# Patient Record
Sex: FEMALE | Race: OTHER | NOT HISPANIC OR LATINO
[De-identification: names, ages, dates, MRNs, and addresses within clinical notes are randomized per-mention and may not be internally consistent; named-entity substitution may affect disease eponyms.]

---
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
## 2022-08-17 ENCOUNTER — EMERGENCY (EMERGENCY)
Facility: HOSPITAL | Age: 22
LOS: 1 days | Discharge: HOME | End: 2022-08-17
Attending: EMERGENCY MEDICINE | Admitting: EMERGENCY MEDICINE

## 2022-08-17 VITALS
HEART RATE: 75 BPM | DIASTOLIC BLOOD PRESSURE: 63 MMHG | TEMPERATURE: 98 F | OXYGEN SATURATION: 100 % | RESPIRATION RATE: 17 BRPM | SYSTOLIC BLOOD PRESSURE: 119 MMHG

## 2022-08-17 PROCEDURE — 99283 EMERGENCY DEPT VISIT LOW MDM: CPT

## 2022-08-17 PROCEDURE — 73620 X-RAY EXAM OF FOOT: CPT | Mod: 26,RT

## 2022-08-17 RX ORDER — CEPHALEXIN 500 MG
1 CAPSULE ORAL
Qty: 40 | Refills: 0
Start: 2022-08-17 | End: 2022-08-26

## 2022-08-17 RX ORDER — CEPHALEXIN 500 MG
500 CAPSULE ORAL ONCE
Refills: 0 | Status: COMPLETED | OUTPATIENT
Start: 2022-08-17 | End: 2022-08-17

## 2022-08-17 RX ADMIN — Medication 500 MILLIGRAM(S): at 13:28

## 2022-08-17 NOTE — ED PROVIDER NOTE - PHYSICAL EXAMINATION
ATTENDING PHYSICAL EXAM  GEN - NAD; well appearing; A+O x3, noted ambulation with slight limp due to right 1st toe pain.  HEAD - NC/AT; EYES/NOSE - PERRL, EOMI, mucous membranes moist, no discharge; THROAT: Oral cavity and pharynx normal. No inflammation, swelling, exudate, or lesions  NECK: Neck supple, non-tender without lymphadenopathy, no masses, no JVD  PULMONARY - CTA b/l, symmetric breath sounds, no w/r/r  CARDIAC -s1s2, RRR, no M,R,G  ABDOMEN - +NABS, ND, NT, soft, no guarding, no rebound, no masses   BACK - no CVA tenderness, No vertebral or paravertebral tenderness  EXTREMITIES - Right hip and knee normal.  No calf tenderness.  No edema.  Right ankle normal.  Right foot without bony tenderness or swelling besides right 1st toe.  Toe without obvious deformity, no laceration.  Nail with white nail polish so unable to see if subungual hematoma.  Nail with slight mobility. + large blister from proximal aspect of nail to mid-prox phalanx on dorsal aspect.  + erythema c/w ceullulitis on dorsal aspect of distal and prox phalanx.  MCP and IP joints with FROM without pain.  Normal cap refill.

## 2022-08-17 NOTE — ED PROVIDER NOTE - PROGRESS NOTE DETAILS
Patient re-assessed.  Feels well.  Erythema has slightly decreased from line drawn marking edge of cellulitis.  Xray result reviewed.  I also reviewed xray and noted circular deformity in middle of distal phalanx.  Discussed with radiologist who feels likely an old injury as well demarcated.  Patient now reports did have a toe injury a few years ago.  She is stable for discharge.  Discussed need for antibiotic and return precautions. Patient reported that pharmacy called her to say that they do not have medication in stock.  Prescription transmitted to [facility] on [facility].

## 2022-08-17 NOTE — ED ADULT NURSE NOTE - OBJECTIVE STATEMENT
Received patient in room 15 c/o right great toe pain associated w/redness, swelling today, hx of injury 4 days ago. Patient denies fever, chills. Patient is A&OX4, airway patent, breathing unlabored and even. Awaiting MD evaluation. Side rails up and safety maintained. Fall precaution in place.

## 2022-08-17 NOTE — ED PROVIDER NOTE - CLINICAL SUMMARY MEDICAL DECISION MAKING FREE TEXT BOX
20 y/o F s/p right 1st toe trauma 4 days ago, now with cellulitis.  No evidence fx or open fx.  Will check xray.  Patient without any high risk conditions requiring IV abx.  Will treat with cephalexin.

## 2022-08-17 NOTE — ED PROVIDER NOTE - PATIENT PORTAL LINK FT
You can access the FollowMyHealth Patient Portal offered by [facility] by registering at the following website: [email or web address]. By joining [employer]’s FollowMyHealth portal, you will also be able to view your health information using other applications (apps) compatible with our system.

## 2022-08-17 NOTE — ED ADULT NURSE NOTE - NSIMPLEMENTINTERV_GEN_ALL_ED
Implemented All Fall with Harm Risk Interventions:  [place] to call system. Call bell, personal items and telephone within reach. Instruct patient to call for assistance. Room bathroom lighting operational. Non-slip footwear when patient is off stretcher. Physically safe environment: no spills, clutter or unnecessary equipment. Stretcher in lowest position, wheels locked, appropriate side rails in place. Provide visual cue, wrist band, yellow gown, etc. Monitor gait and stability. Monitor for mental status changes and reorient to person, place, and time. Review medications for side effects contributing to fall risk. Reinforce activity limits and safety measures with patient and family. Provide visual clues: red socks.

## 2022-08-17 NOTE — ED PROVIDER NOTE - NSFOLLOWUPINSTRUCTIONS_ED_ALL_ED_FT
Take cephalexin antibiotic as prescribed.  Do not break blister.  It will rupture on its own.  Can soak foot in warm epsom salt water twice a day.  Take tylenol or ibuprofen for pain.  Follow up with your doctor within one week for re-evaluation.  Return to ER immediately for increased pain, increased swelling or redness, or any further concern.

## 2022-08-17 NOTE — ED ADULT TRIAGE NOTE - CHIEF COMPLAINT QUOTE
Pt c/o pain and swelling to right large to after injury 4 days ago. Pt was seen at urgent care and told to take Motrin for pain. Pt endorsing persistent pain.

## 2022-08-17 NOTE — ED PROVIDER NOTE - OBJECTIVE STATEMENT
20 y/o F with cousin at bedside.  Reports she 'stubbed' right 1st toe on Sunday.  Has continued to have mild pain in toe.  To ED as having increased redness and swelling since yesterday.  To PMD yesterday who did xray and prescribed lidocaine spray and ibuprofen.  No fever.  No other foot or ankle pain.  No other injury.  Denies recent cough or covid.

## 2022-08-22 ENCOUNTER — EMERGENCY (EMERGENCY)
Facility: HOSPITAL | Age: 22
LOS: 1 days | Discharge: HOME | End: 2022-08-22
Attending: EMERGENCY MEDICINE
Payer: MEDICAID

## 2022-08-22 VITALS
OXYGEN SATURATION: 99 % | RESPIRATION RATE: 16 BRPM | TEMPERATURE: 99 F | DIASTOLIC BLOOD PRESSURE: 75 MMHG | SYSTOLIC BLOOD PRESSURE: 115 MMHG | HEART RATE: 68 BPM

## 2022-08-22 VITALS
WEIGHT: 156.97 LBS | TEMPERATURE: 98 F | OXYGEN SATURATION: 97 % | SYSTOLIC BLOOD PRESSURE: 115 MMHG | HEIGHT: 67 IN | RESPIRATION RATE: 16 BRPM | DIASTOLIC BLOOD PRESSURE: 80 MMHG | HEART RATE: 68 BPM

## 2022-08-22 LAB
ALBUMIN SERPL ELPH-MCNC: 4.4 G/DL — SIGNIFICANT CHANGE UP (ref 3.3–5)
ALP SERPL-CCNC: 65 U/L — SIGNIFICANT CHANGE UP (ref 40–120)
ALT FLD-CCNC: 21 U/L — SIGNIFICANT CHANGE UP (ref 10–45)
ANION GAP SERPL CALC-SCNC: 13 MMOL/L — SIGNIFICANT CHANGE UP (ref 5–17)
AST SERPL-CCNC: 33 U/L — SIGNIFICANT CHANGE UP (ref 10–40)
BASOPHILS # BLD AUTO: 0.02 K/UL — SIGNIFICANT CHANGE UP (ref 0–0.2)
BASOPHILS NFR BLD AUTO: 0.3 % — SIGNIFICANT CHANGE UP (ref 0–2)
BILIRUB SERPL-MCNC: 0.8 MG/DL — SIGNIFICANT CHANGE UP (ref 0.2–1.2)
BUN SERPL-MCNC: 6 MG/DL — LOW (ref 7–23)
CALCIUM SERPL-MCNC: 9.2 MG/DL — SIGNIFICANT CHANGE UP (ref 8.4–10.5)
CHLORIDE SERPL-SCNC: 105 MMOL/L — SIGNIFICANT CHANGE UP (ref 96–108)
CO2 SERPL-SCNC: 20 MMOL/L — LOW (ref 22–31)
CREAT SERPL-MCNC: 0.54 MG/DL — SIGNIFICANT CHANGE UP (ref 0.5–1.3)
EGFR: 135 ML/MIN/1.73M2 — SIGNIFICANT CHANGE UP
EOSINOPHIL # BLD AUTO: 0.09 K/UL — SIGNIFICANT CHANGE UP (ref 0–0.5)
EOSINOPHIL NFR BLD AUTO: 1.2 % — SIGNIFICANT CHANGE UP (ref 0–6)
FLUAV AG NPH QL: SIGNIFICANT CHANGE UP
FLUBV AG NPH QL: SIGNIFICANT CHANGE UP
GLUCOSE SERPL-MCNC: 102 MG/DL — HIGH (ref 70–99)
HCG SERPL-ACNC: <2 MIU/ML — SIGNIFICANT CHANGE UP
HCT VFR BLD CALC: 37.8 % — SIGNIFICANT CHANGE UP (ref 34.5–45)
HGB BLD-MCNC: 12.8 G/DL — SIGNIFICANT CHANGE UP (ref 11.5–15.5)
IMM GRANULOCYTES NFR BLD AUTO: 0.5 % — SIGNIFICANT CHANGE UP (ref 0–1.5)
LYMPHOCYTES # BLD AUTO: 1.45 K/UL — SIGNIFICANT CHANGE UP (ref 1–3.3)
LYMPHOCYTES # BLD AUTO: 18.5 % — SIGNIFICANT CHANGE UP (ref 13–44)
MAGNESIUM SERPL-MCNC: 1.9 MG/DL — SIGNIFICANT CHANGE UP (ref 1.6–2.6)
MCHC RBC-ENTMCNC: 30.1 PG — SIGNIFICANT CHANGE UP (ref 27–34)
MCHC RBC-ENTMCNC: 33.9 GM/DL — SIGNIFICANT CHANGE UP (ref 32–36)
MCV RBC AUTO: 88.9 FL — SIGNIFICANT CHANGE UP (ref 80–100)
MONOCYTES # BLD AUTO: 0.79 K/UL — SIGNIFICANT CHANGE UP (ref 0–0.9)
MONOCYTES NFR BLD AUTO: 10.1 % — SIGNIFICANT CHANGE UP (ref 2–14)
NEUTROPHILS # BLD AUTO: 5.43 K/UL — SIGNIFICANT CHANGE UP (ref 1.8–7.4)
NEUTROPHILS NFR BLD AUTO: 69.4 % — SIGNIFICANT CHANGE UP (ref 43–77)
NRBC # BLD: 0 /100 WBCS — SIGNIFICANT CHANGE UP (ref 0–0)
PHOSPHATE SERPL-MCNC: 3.5 MG/DL — SIGNIFICANT CHANGE UP (ref 2.5–4.5)
PLATELET # BLD AUTO: 252 K/UL — SIGNIFICANT CHANGE UP (ref 150–400)
POTASSIUM SERPL-MCNC: 5.2 MMOL/L — SIGNIFICANT CHANGE UP (ref 3.5–5.3)
POTASSIUM SERPL-SCNC: 5.2 MMOL/L — SIGNIFICANT CHANGE UP (ref 3.5–5.3)
PROT SERPL-MCNC: 8.1 G/DL — SIGNIFICANT CHANGE UP (ref 6–8.3)
RBC # BLD: 4.25 M/UL — SIGNIFICANT CHANGE UP (ref 3.8–5.2)
RBC # FLD: 12 % — SIGNIFICANT CHANGE UP (ref 10.3–14.5)
RSV RNA NPH QL NAA+NON-PROBE: SIGNIFICANT CHANGE UP
SARS-COV-2 RNA SPEC QL NAA+PROBE: SIGNIFICANT CHANGE UP
SODIUM SERPL-SCNC: 138 MMOL/L — SIGNIFICANT CHANGE UP (ref 135–145)
TROPONIN T, HIGH SENSITIVITY RESULT: <6 NG/L — SIGNIFICANT CHANGE UP (ref 0–51)
WBC # BLD: 7.82 K/UL — SIGNIFICANT CHANGE UP (ref 3.8–10.5)
WBC # FLD AUTO: 7.82 K/UL — SIGNIFICANT CHANGE UP (ref 3.8–10.5)

## 2022-08-22 PROCEDURE — 93308 TTE F-UP OR LMTD: CPT | Mod: 26

## 2022-08-22 PROCEDURE — 80053 COMPREHEN METABOLIC PANEL: CPT

## 2022-08-22 PROCEDURE — 96376 TX/PRO/DX INJ SAME DRUG ADON: CPT

## 2022-08-22 PROCEDURE — 96375 TX/PRO/DX INJ NEW DRUG ADDON: CPT

## 2022-08-22 PROCEDURE — 84100 ASSAY OF PHOSPHORUS: CPT

## 2022-08-22 PROCEDURE — 71045 X-RAY EXAM CHEST 1 VIEW: CPT

## 2022-08-22 PROCEDURE — 87637 SARSCOV2&INF A&B&RSV AMP PRB: CPT

## 2022-08-22 PROCEDURE — 99285 EMERGENCY DEPT VISIT HI MDM: CPT | Mod: 25

## 2022-08-22 PROCEDURE — 84484 ASSAY OF TROPONIN QUANT: CPT

## 2022-08-22 PROCEDURE — 85025 COMPLETE CBC W/AUTO DIFF WBC: CPT

## 2022-08-22 PROCEDURE — 93308 TTE F-UP OR LMTD: CPT

## 2022-08-22 PROCEDURE — 84702 CHORIONIC GONADOTROPIN TEST: CPT

## 2022-08-22 PROCEDURE — 93005 ELECTROCARDIOGRAM TRACING: CPT

## 2022-08-22 PROCEDURE — 93010 ELECTROCARDIOGRAM REPORT: CPT

## 2022-08-22 PROCEDURE — 71045 X-RAY EXAM CHEST 1 VIEW: CPT | Mod: 26

## 2022-08-22 PROCEDURE — 96374 THER/PROPH/DIAG INJ IV PUSH: CPT

## 2022-08-22 PROCEDURE — 83735 ASSAY OF MAGNESIUM: CPT

## 2022-08-22 RX ORDER — ONDANSETRON 8 MG/1
4 TABLET, FILM COATED ORAL ONCE
Refills: 0 | Status: COMPLETED | OUTPATIENT
Start: 2022-08-22 | End: 2022-08-22

## 2022-08-22 RX ORDER — SODIUM CHLORIDE 9 MG/ML
1000 INJECTION, SOLUTION INTRAVENOUS ONCE
Refills: 0 | Status: COMPLETED | OUTPATIENT
Start: 2022-08-22 | End: 2022-08-22

## 2022-08-22 RX ORDER — FAMOTIDINE 10 MG/ML
20 INJECTION INTRAVENOUS ONCE
Refills: 0 | Status: COMPLETED | OUTPATIENT
Start: 2022-08-22 | End: 2022-08-22

## 2022-08-22 RX ADMIN — ONDANSETRON 4 MILLIGRAM(S): 8 TABLET, FILM COATED ORAL at 08:07

## 2022-08-22 RX ADMIN — ONDANSETRON 4 MILLIGRAM(S): 8 TABLET, FILM COATED ORAL at 12:35

## 2022-08-22 RX ADMIN — Medication 1 MILLIGRAM(S): at 10:14

## 2022-08-22 RX ADMIN — SODIUM CHLORIDE 1000 MILLILITER(S): 9 INJECTION, SOLUTION INTRAVENOUS at 08:07

## 2022-08-22 RX ADMIN — FAMOTIDINE 20 MILLIGRAM(S): 10 INJECTION INTRAVENOUS at 08:47

## 2022-08-22 NOTE — ED PROVIDER NOTE - NSFOLLOWUPINSTRUCTIONS_ED_ALL_ED_FT
Nausea / Vomiting    Nausea is the feeling that you have to vomit. As nausea gets worse, it can lead to vomiting. Vomiting puts you at an increased risk for dehydration. Older adults and people with other diseases or a weak immune system are at higher risk for dehydration. Drink clear fluids in small but frequent amounts as tolerated. Eat bland, easy-to-digest foods in small amounts as tolerated.    Your symptoms may be the result of the antibiotic you are taking, we have prescribed you a new antibiotic, please take as prescribed. Please make sure to take your antibiotic with food.     You may take Tylenol, and/or Ibuprofen as needed for pain. Please follow dosing instructions on medication labeling.    We have referred you to a podiatrist, please call the number provided to schedule an appointment. Our referral team will reach out to you over the next couple of days to help you schedule an appointment if you have are unable to.     SEEK IMMEDIATE MEDICAL CARE IF YOU HAVE ANY OF THE FOLLOWING SYMPTOMS: fever, inability to keep sufficient fluids down, black or bloody vomitus, black or bloody stools, lightheadedness/dizziness, chest pain, severe headache, rash, shortness of breath, cold or clammy skin, confusion, pain with urination, or any signs of dehydration.

## 2022-08-22 NOTE — ED PROVIDER NOTE - CLINICAL SUMMARY MEDICAL DECISION MAKING FREE TEXT BOX
21F w/ no PMHx presents with 3 days of Nausea, decreased PO, weakness, CP, subjective SOB, and toe pain. Pt recent [facility] ED visit 8/17 for Left 1st toe infection, prescribed keflex, nausea w/ emesis post abx administration. CP substernal. No family cardiac hx. LMP 1 mo ago. Toe pain improved. AVSS. abd soft. NDNT, lungs CTA b/l, toe with some dry discharge, loose nail ttp, no fluctuance or erythema. Medication side effects vs pericardiditis/myocardidits. Will get labs, TTE + trop to r/o myocarditis, give meds and reassess. 21F w/ no PMHx presents with 3 days of Nausea, decreased PO, weakness, CP, subjective SOB, and toe pain. Pt recent [facility] ED visit 8/17 for Left 1st toe infection, prescribed keflex, nausea w/ emesis post abx administration. CP substernal. No family cardiac hx. LMP 1 mo ago. Toe pain improved. AVSS. abd soft. NDNT, lungs CTA b/l, toe with some dry discharge, loose nail ttp, no fluctuance or erythema. Medication side effects vs pericardiditis/myocardidits. Will get labs, TTE + trop to r/o myocarditis, give meds and reassess.  teodoro - 21 f  w recent paranychia tx w abx with nausae and weakness - toe improved no longer [id number] and fluctuant still draining - no crepitus - now co of days of intermitt cp lightheadedness and sopb mild non prod cough , covid vaccinated no fever- vitals unremarkable- low suspicion for acs , no risk factors no ischem changes on ekg -- will pocus eval and send single trop as screening for myocarditis

## 2022-08-22 NOTE — ED PROVIDER NOTE - PATIENT PORTAL LINK FT
You can access the FollowMyHealth Patient Portal offered by [facility] by registering at the following website: [email or web address]. By joining [employer]’s FollowMyHealth portal, you will also be able to view your health information using other applications (apps) compatible with our system.

## 2022-08-22 NOTE — ED ADULT NURSE NOTE - OBJECTIVE STATEMENT
Pt presented to ed with c/o generalized weakness, decreased PO intake, nausea/vomiting, feeling anxious, dizziness, chest heaviness, lightheadedness that started 1 wk ago. pt has been taking antibiotics - Cephalexin 500mg 4 times a day for her RT big toe infection. pt denies fever, chills, sob. pt also reports feeling depressed lately. pt denies suicidal or homicidal ideation at this time. Will continue to monitor. Pt presented to ed with c/o generalized weakness, decreased PO intake, nausea/vomiting, feeling anxious, dizziness, chest heaviness, lightheadedness that started 1 wk ago. pt has been taking antibiotics - Cephalexin 500mg 4 times a day for her RT big toe infection. Last dose - yesterday. pt denies fever, chills, sob. pt also reports feeling depressed lately. pt denies suicidal or homicidal ideation at this time. Will continue to monitor.

## 2022-08-22 NOTE — ED PROVIDER NOTE - PHYSICAL EXAMINATION
GENERAL: well appearing in no acute distress, non-toxic appearing  CARDIAC: regular rate and rhythm, normal S1S2, no appreciable murmurs, 2+ pulses in UE/LE b/l  PULM: normal breath sounds, clear to ascultation bilaterally, no rales, rhonchi, wheezing  GI: Abd soft, nondistended, nontender, no rebound tenderness, no guarding, no rigidity  : no CVA tenderness b/l, no suprapubic tenderness  NEURO:  normal speech, AAOx3  SKIN: well-perfused, extremities warm, no visible rashes, Left 1st toe with loose nail ttp, dry discharge, no fluctuance or erythema.   PSYCH: appropriate mood and affect

## 2022-08-22 NOTE — ED ADULT TRIAGE NOTE - CHIEF COMPLAINT QUOTE
weakness, nausea, decreased PO intake, intermittent CP. on oral antibiotics for toe infection, denies fever.

## 2022-08-22 NOTE — ED ADULT NURSE REASSESSMENT NOTE - NS ED NURSE REASSESS COMMENT FT1
Pt is a&ox3. Breathing at ease. pt reports nausea and feeling anxious after eating breakfast. MD Arambula, Anthony notified of pt's symptoms.

## 2022-08-22 NOTE — ED PROVIDER NOTE - NSFOLLOWUPCLINICS_GEN_ALL_ED_FT
[facility]  Podiatry  [address] [place] [address] - [address] [place] [address]  Phone: [phone]  Fax:

## 2022-08-22 NOTE — ED PROVIDER NOTE - PROGRESS NOTE DETAILS
low suspicion for acs, trop sent ot eval for myocarditis offered pt scalpel drain of healing paronychia explained that with improvement and drainage already w abx may not get any further drainage -- pt declines will continue abx Patient w/u negative, trop <6, symtomatically improved with medication and IVF. Symptoms likely due to medication side effects, will PO challenge and prescribe different antibiotics and DC with podiatry f/u

## 2022-08-22 NOTE — ED PROVIDER NOTE - OBJECTIVE STATEMENT
21F w/ no PMHx presents with 3 days of Nausea, decreased PO, weakness, CP, subjective SOB, and toe pain. Pt recent [facility] ED visit 8/17 for Left 1st toe infection, prescribed keflex, nausea w/ emesis post abx administration. CP substernal. No family cardiac hx. LMP 1 mo ago. Toe pain improved. Denies fever/chills, back pain, abd pain, dysuria, vaginal discharge.

## 2022-08-22 NOTE — ED ADULT NURSE NOTE - NSIMPLEMENTINTERV_GEN_ALL_ED
Implemented All Fall Risk Interventions:  [place] to call system. Call bell, personal items and telephone within reach. Instruct patient to call for assistance. Room bathroom lighting operational. Non-slip footwear when patient is off stretcher. Physically safe environment: no spills, clutter or unnecessary equipment. Stretcher in lowest position, wheels locked, appropriate side rails in place. Provide visual cue, wrist band, yellow gown, etc. Monitor gait and stability. Monitor for mental status changes and reorient to person, place, and time. Review medications for side effects contributing to fall risk. Reinforce activity limits and safety measures with patient and family.

## 2022-08-27 ENCOUNTER — EMERGENCY (EMERGENCY)
Facility: HOSPITAL | Age: 22
LOS: 1 days | Discharge: HOME | End: 2022-08-27
Attending: STUDENT IN AN ORGANIZED HEALTH CARE EDUCATION/TRAINING PROGRAM
Payer: MEDICAID

## 2022-08-27 VITALS
OXYGEN SATURATION: 100 % | TEMPERATURE: 98 F | DIASTOLIC BLOOD PRESSURE: 77 MMHG | HEART RATE: 62 BPM | SYSTOLIC BLOOD PRESSURE: 117 MMHG | RESPIRATION RATE: 20 BRPM

## 2022-08-27 VITALS
HEART RATE: 72 BPM | WEIGHT: 156.97 LBS | DIASTOLIC BLOOD PRESSURE: 76 MMHG | HEIGHT: 67 IN | SYSTOLIC BLOOD PRESSURE: 109 MMHG | RESPIRATION RATE: 19 BRPM | TEMPERATURE: 98 F | OXYGEN SATURATION: 98 %

## 2022-08-27 PROCEDURE — 99284 EMERGENCY DEPT VISIT MOD MDM: CPT

## 2022-08-27 RX ORDER — ALPRAZOLAM 0.25 MG
0.25 TABLET ORAL ONCE
Refills: 0 | Status: DISCONTINUED | OUTPATIENT
Start: 2022-08-27 | End: 2022-08-27

## 2022-08-27 RX ADMIN — Medication 0.25 MILLIGRAM(S): at 07:57

## 2022-08-27 NOTE — ED PROVIDER NOTE - PHYSICAL EXAMINATION
GENERAL: Awake. Alert. NAD. Well nourished.  HEENT: NC/AT, PERRL, EOMI, Conjunctiva pink, no scleral icterus. Airway patent. Moist mucous membranes.  LUNGS: CTAB. No wheezes or rales noted.  CARDIAC: Chest non-tender to palpation. RRR.  ABDOMEN: No masses noted. Soft, NT, ND, no rebound, no guarding.  EXT: No edema, no calf tenderness, distal pulses 2+ bilaterally  NEURO: A&Ox3. Moving all extremities. Sensation and strength intact throughout.  SKIN: Warm and dry.  PSYCH: Normal affect.

## 2022-08-27 NOTE — ED PROVIDER NOTE - NSFOLLOWUPINSTRUCTIONS_ED_ALL_ED_FT
The hospital will contact you regarding setting up an appointment with psychiatry.  Please see primary care doctor within 1 week.  Please call this number if you experience thoughts of hurting yourself or someone else or thoughts of suicide. [facility] ([phone])    Anxiety    Generalized anxiety disorder (JUSTIN) is a mental disorder. It is defined as anxiety that is not necessarily related to specific events or activities or is out of proportion to said events. Symptoms include restlessness, fatigue, difficulty concentrations, irritability and difficulty concentrating. It may interfere with life functions, including relationships, work, and school. If you were started on a medication, make sure to take exactly as prescribed and follow up with a psychiatrist.    SEEK IMMEDIATE MEDICAL CARE IF YOU HAVE ANY OF THE FOLLOWING SYMPTOMS: thoughts about hurting killing yourself, thoughts about hurting or killing somebody else, hallucinations, or worsening depression.

## 2022-08-27 NOTE — ED PROVIDER NOTE - ATTENDING CONTRIBUTION TO CARE
Attending (Jorge Schaeffer D.O.):  I have personally seen and examined this patient. I have performed a substantive portion of the visit including all aspects of the medical decision making. Resident, fellow, and/or ACP note reviewed. I agree on the plan of care except where noted.    21F w.o hx of anxiety here for 1 week of intermittent anxious feeling with associated palpitations, sweating, lightheadedness, heavy breathing. Patient w/o known trigger. Was told she needs a referral to see a pyschiatrist but her pcp said she did not. Not maintained on any mental health medications. Hemodynamically stable. Aaox4, no tremor, well hydrated. Clear lungs. No audible cardiac murmurs. Full str all 4 exrem with intact sensation. Discussed at length with patient typical indications for psychiatric consultation in ED. Discussed with patient [facility] crisis center. Labs, ekg, POCUS TTE reviewed, no emergent findings. Offered thyroid testing declined. Offered trial of anxiolysis. Accepted. Strict return precautions given with verbal understanding expressed. DDx and utility of lab testing and advanced imaging discussed w/ patient. Verbal understanding expressed. Agreeable to plan.

## 2022-08-27 NOTE — ED PROVIDER NOTE - NSPTACCESSSVCSAPPTDETAILS_ED_ALL_ED_FT
Patient needs psych appointment for anxiety, difficulty obtaining apt on their own due to insurance. Needs cardiology f/u as well Patient needs psych appointment for anxiety, difficulty obtaining apt on their own due to insurance. Needs cardiology f/u as well    information for [facility]

## 2022-08-27 NOTE — ED PROVIDER NOTE - OBJECTIVE STATEMENT
21F PMH asthma, recent L first toe paronychia s/p keflex and bactrim, starting ciprofloxacin today presenting with 2 weeks of anxiety episodes. Pt reports lightheadedness, palpitations, sternal chest heaviness, diaphoresis and chills that wake her up from sleep and occur throughout the day. Pt reports anxiety at baseline for the last year, but has not been able to see a psychiatrist due to insurance. She was seen 5 days ago at [facility] for the same anxiety sx and n/v associated with abx. Ecg and echo done at that time were without emergent findings. Pt reports she returned to the ED today because she is unable to obtain psychiatry appointment by herself. Denies calf pain/swelling, hx of blood clots, recent travel/immobilization/surgery/malignancy, hormone use. Pt nonsmoker. LMP 1 mo ago. Denies fever, n/v. Denies SI/HI. Reports decreased appetite, decreased energy, trouble sleeping, trouble concentrating.

## 2022-08-27 NOTE — ED PROVIDER NOTE - CLINICAL SUMMARY MEDICAL DECISION MAKING FREE TEXT BOX
21F PMH asthma, recent L first toe paronychia s/p keflex and bactrim, starting ciprofloxacin today presenting with 2 weeks of anxiety episodes. Pt with nonactionable workup 5 days ago with no change in sx. Given hx and physical, ddx includes but is not limited to JUSTIN, panic attacks, hypothyroid. Low concern for cardiac cause given neg trop, and ecg/echo without emergent findings. Plan for one time dose of xanax here and discharge lounge for psych f/u.

## 2022-08-27 NOTE — ED PROVIDER NOTE - NSFOLLOWUPCLINICS_GEN_ALL_ED_FT
[facility]  Behavioral Health  [address] [place] [address]  Phone: [phone]  Fax:   Follow Up Time: Urgent

## 2022-08-27 NOTE — ED PROVIDER NOTE - PATIENT PORTAL LINK FT
You can access the FollowMyHealth Patient Portal offered by [facility] by registering at the following website: [email or web address]. By joining [employer]’s FollowMyHealth portal, you will also be able to view your health information using other applications (apps) compatible with our system.

## 2022-08-27 NOTE — ED ADULT NURSE NOTE - OBJECTIVE STATEMENT
21 y.o female, A&Ox4, PMH asthma, pt presents to ED c/o anxiety. pt states she has been having increased anxiety for the past week. pt states she was recently diagnosed with a right big toe infection and has been started on antibiotics. pt states she has been on multiple different antibiotics and states every time she takes a new antibiotic she deals with increased anxiety, states antibiotics had to be changed due her reaction of anxiety with them. pt was recently started on ciprofloxacin 2 days ago. pt currently experiencing increased anxiety, pt states anxiety comes on at the same time everyday which is a this time in the morning. pt states she experiences heart racing, lightheadedness, nausea, difficulty catching her breath, inability to sit still. pt denies SI/HI. pt denies chest pain, fevers, chills, cough. pt safety and comfort provided.

## 2022-08-27 NOTE — ED PROVIDER NOTE - PROGRESS NOTE DETAILS
Dee Dee Figueroa DO (PGY2): Pt with nonactionable workup 5 days ago at last visit. Pt requesting help for psychiatrist appointment. NO new sx from prior appointment. WIll give pt discharge lounge for psych and xanax 0.25mg while here. Pt made aware of plan and agrees. Questions regarding their symptoms were addressed. Advised to follow up with psych and pcp. Given strict return precautions. Pt verbalized understanding.

## 2022-12-09 NOTE — ED ADULT NURSE NOTE - NSHOSCREENINGQ1_ED_ALL_ED
Detail Level: Zone Detail Level: Detailed Medical Necessity Clause: Botulinum toxin hyperhidrosis therapy is medically necessary because Medical Necessity Information: LCD Guidelines vary from payer to payer. Please check with your payer's policy to determine medical necessity. No

## 2023-11-29 PROBLEM — Z78.9 OTHER SPECIFIED HEALTH STATUS: Chronic | Status: ACTIVE | Noted: 2022-08-17
